# Patient Record
Sex: MALE | Race: ASIAN | NOT HISPANIC OR LATINO
[De-identification: names, ages, dates, MRNs, and addresses within clinical notes are randomized per-mention and may not be internally consistent; named-entity substitution may affect disease eponyms.]

---

## 2020-05-04 ENCOUNTER — APPOINTMENT (OUTPATIENT)
Dept: OTOLARYNGOLOGY | Facility: CLINIC | Age: 40
End: 2020-05-04

## 2020-07-13 ENCOUNTER — APPOINTMENT (OUTPATIENT)
Dept: OTOLARYNGOLOGY | Facility: CLINIC | Age: 40
End: 2020-07-13
Payer: COMMERCIAL

## 2020-07-13 VITALS
BODY MASS INDEX: 25.23 KG/M2 | HEIGHT: 66 IN | WEIGHT: 157 LBS | TEMPERATURE: 97.8 F | SYSTOLIC BLOOD PRESSURE: 128 MMHG | HEART RATE: 72 BPM | DIASTOLIC BLOOD PRESSURE: 91 MMHG

## 2020-07-13 DIAGNOSIS — Z78.9 OTHER SPECIFIED HEALTH STATUS: ICD-10-CM

## 2020-07-13 DIAGNOSIS — Z87.891 PERSONAL HISTORY OF NICOTINE DEPENDENCE: ICD-10-CM

## 2020-07-13 PROCEDURE — G0268 REMOVAL OF IMPACTED WAX MD: CPT

## 2020-07-13 PROCEDURE — 92567 TYMPANOMETRY: CPT

## 2020-07-13 PROCEDURE — 99204 OFFICE O/P NEW MOD 45 MIN: CPT | Mod: 25

## 2020-07-13 PROCEDURE — 92557 COMPREHENSIVE HEARING TEST: CPT

## 2020-07-13 RX ORDER — METHYLPREDNISOLONE 4 MG/1
4 TABLET ORAL
Qty: 21 | Refills: 0 | Status: ACTIVE | COMMUNITY
Start: 2020-07-13 | End: 1900-01-01

## 2020-07-13 RX ORDER — ICOSAPENT ETHYL 1000 MG/1
1 CAPSULE ORAL
Refills: 0 | Status: ACTIVE | COMMUNITY

## 2020-07-13 RX ORDER — MULTIVITAMIN
TABLET ORAL
Refills: 0 | Status: ACTIVE | COMMUNITY

## 2020-07-13 NOTE — DATA REVIEWED
[de-identified] : sl left Hearing loss\par Hearing Test performed to evaluate the extent of hearing loss and  to explain pt's symptoms\par

## 2020-07-13 NOTE — HISTORY OF PRESENT ILLNESS
[Vertigo] : vertigo [Dizziness] : dizziness [No] : patient does not have a  history of radiation therapy [de-identified] : 40 yo male\par Patient complains of vertigo x 3 weeks. States when he turns his head to the left the room starts spinning lasting several seconds. Gets left sided ear fullness with vertigo. Had an episode this morning. Has intermittent left ear tinnitus.  Now  he is feeling well. \par Also states back in September he had 3 ear infections left ear,  never had ear infections before. \par Pt has no ear pain, ear drainage, hearing loss, nasal congestion, nasal discharge, epistaxis, sinus infections, facial pain, facial pressure, throat pain, dysphagia or fevers\par \par  [Anxiety] : no anxiety [Headache] : no headache [Hearing Loss] : no hearing loss [Recurrent Otitis Media] : no recurrent otitis media [Otitis Media with Effusion] : no otitis media with effusion [Presbycusis] : no presbycusis [Congenital Ear Malformation] : no congenital ear malformation [Meniere Disease] : no Meniere disease [Otosclerosis] : no otosclerosis [Hypertension] : no hypertension [Perilymphatic Fistula] : no perilymphatic fistula [Loud Noise Exposure] : no history of loud noise exposure [Smoking] : no smoking [Early Onset Hearing Loss] : no early onset hearing loss [Stroke] : no stroke [Facial Pain] : no facial pain [Facial Pressure] : no facial pressure [Nasal Congestion] : no nasal congestion [Diplopia] : no diplopia [Ear Fullness] : no ear fullness [Allergic Rhinitis] : no allergic rhinitis [Maxillary Tooth Infection] : no maxillary tooth infection [Septal Deviation] : no septal deviation [Environmental Allergies] : no environmental allergies [Seasonal Allergies] : no seasonal allergies [Environmental Allergens] : no environmental allergens [Adenoidectomy] : no adenoidectomy [Nasal FB Removal] : no nasal foreign body removal [Allergies] : no allergies [Asthma] : no asthma [Neck Mass] : no neck mass [Neck Pain] : no neck pain [Chills] : no chills [Cold Intolerance] : no cold intolerance [Cough] : no cough [Heat Intolerance] : no heat intolerance [Fatigue] : no fatigue [Sialadenitis] : no sialadenitis [Hyperthyroidism] : no hyperthyroidism [Hodgkin Disease] : no hodgkin disease [Non-Hodgkin Lymphoma] : no non-hodgkin lymphoma [Tobacco Use] : no tobacco use [Alcohol Use] : no alcohol use [Graves Disease] : no graves disease [Thyroid Cancer] : no thyroid cancer

## 2020-07-13 NOTE — ASSESSMENT
[FreeTextEntry1] : Left Periph vestibulopathy 2/2 recent infection\par -Hearing Test performed to evaluate the extent of hearing loss and to explain pt's symptoms\par \par -Rx: medrol \par poss vestib testing and rehab\par \par

## 2020-07-13 NOTE — PHYSICAL EXAM
[Midline] : trachea located in midline position [Normal] : gait was normal [Lian-Ranjanake] : [place]-Hallpike: Positive [Hearing Loss Left Only] : normal [Hearing Loss Right Only] : normal [Nystagmus] : ~T no ~M nystagmus was seen [Fukuda Step Test] : Fukuda Step Test was Negative [FreeTextEntry8] : wax [FreeTextEntry9] : wax [de-identified] : Lian Hallpike + to the left-dizzy w/o nystagmus

## 2020-07-16 ENCOUNTER — OUTPATIENT (OUTPATIENT)
Dept: OUTPATIENT SERVICES | Facility: HOSPITAL | Age: 40
LOS: 1 days | Discharge: STILL ADMITTED OR ADMINISTRATIVE | End: 2020-07-16

## 2020-07-17 DIAGNOSIS — F41.0 PANIC DISORDER [EPISODIC PAROXYSMAL ANXIETY]: ICD-10-CM

## 2020-07-17 DIAGNOSIS — F41.1 GENERALIZED ANXIETY DISORDER: ICD-10-CM

## 2020-08-14 ENCOUNTER — APPOINTMENT (OUTPATIENT)
Dept: OTOLARYNGOLOGY | Facility: CLINIC | Age: 40
End: 2020-08-14
Payer: COMMERCIAL

## 2020-08-14 VITALS
BODY MASS INDEX: 25.23 KG/M2 | SYSTOLIC BLOOD PRESSURE: 125 MMHG | TEMPERATURE: 97.9 F | DIASTOLIC BLOOD PRESSURE: 89 MMHG | HEIGHT: 66 IN | WEIGHT: 157 LBS | HEART RATE: 66 BPM

## 2020-08-14 DIAGNOSIS — R42 DIZZINESS AND GIDDINESS: ICD-10-CM

## 2020-08-14 DIAGNOSIS — J31.0 CHRONIC RHINITIS: ICD-10-CM

## 2020-08-14 DIAGNOSIS — J34.2 DEVIATED NASAL SEPTUM: ICD-10-CM

## 2020-08-14 DIAGNOSIS — H81.92 UNSPECIFIED DISORDER OF VESTIBULAR FUNCTION, LEFT EAR: ICD-10-CM

## 2020-08-14 DIAGNOSIS — H61.23 IMPACTED CERUMEN, BILATERAL: ICD-10-CM

## 2020-08-14 DIAGNOSIS — R06.83 SNORING: ICD-10-CM

## 2020-08-14 DIAGNOSIS — H90.42 SENSORINEURAL HEARING LOSS, UNILATERAL, LEFT EAR, WITH UNRESTRICTED HEARING ON THE CONTRALATERAL SIDE: ICD-10-CM

## 2020-08-14 DIAGNOSIS — H93.12 TINNITUS, LEFT EAR: ICD-10-CM

## 2020-08-14 PROCEDURE — 92567 TYMPANOMETRY: CPT

## 2020-08-14 PROCEDURE — 92557 COMPREHENSIVE HEARING TEST: CPT

## 2020-08-14 PROCEDURE — 99214 OFFICE O/P EST MOD 30 MIN: CPT | Mod: 25

## 2020-08-14 PROCEDURE — 31231 NASAL ENDOSCOPY DX: CPT

## 2020-08-14 NOTE — ASSESSMENT
[FreeTextEntry1] : Left Periph vestibulopathy 2/2 recent infection\par -now resolved \par -Hearing Test performed to evaluate the extent of hearing loss and to explain pt's symptoms-wnl\par \par Snoringw/ assoc DNS\par -advised for mouth guard and breathe right nasal  srips\par \par f/u prn

## 2020-08-14 NOTE — PHYSICAL EXAM
[Midline] : trachea located in midline position [Nasal Endoscopy Performed] : nasal endoscopy was performed, see procedure section for findings [] : septum deviated to the right [Normal] : the left external canal was normal [Hearing Loss Left Only] : normal [Hearing Loss Right Only] : normal [Fukuda Step Test] : Fukuda Step Test was Negative [Nystagmus] : ~T no ~M nystagmus was seen

## 2020-08-14 NOTE — HISTORY OF PRESENT ILLNESS
[Vertigo] : vertigo [No] : patient does not have a  history of radiation therapy [Dizziness] : dizziness [de-identified] : 38 yo male\par Patient complains of vertigo x 3 weeks. States when he turns his head to the left the room starts spinning lasting several seconds. Gets left sided ear fullness with vertigo. Had an episode this morning. Has intermittent left ear tinnitus.  Now  he is feeling well. \par Also states back in September he had 3 ear infections left ear,  never had ear infections before. \par Pt has no ear pain, ear drainage, hearing loss, nasal congestion, nasal discharge, epistaxis, sinus infections, facial pain, facial pressure, throat pain, dysphagia or fevers\par \par  [FreeTextEntry1] : 8/13/2020-Patient presents for follow up. States vertigo has resolved, hearing has improved. Finished Medrol dose pack. \par Complains that he snores at night and was told by a different ENT that he has large adenoids.  [Anxiety] : no anxiety [Hearing Loss] : no hearing loss [Headache] : no headache [Presbycusis] : no presbycusis [Recurrent Otitis Media] : no recurrent otitis media [Otitis Media with Effusion] : no otitis media with effusion [Congenital Ear Malformation] : no congenital ear malformation [Meniere Disease] : no Meniere disease [Otosclerosis] : no otosclerosis [Loud Noise Exposure] : no history of loud noise exposure [Hypertension] : no hypertension [Perilymphatic Fistula] : no perilymphatic fistula [Early Onset Hearing Loss] : no early onset hearing loss [Smoking] : no smoking [Facial Pain] : no facial pain [Stroke] : no stroke [Facial Pressure] : no facial pressure [Nasal Congestion] : no nasal congestion [Diplopia] : no diplopia [Ear Fullness] : no ear fullness [Maxillary Tooth Infection] : no maxillary tooth infection [Allergic Rhinitis] : no allergic rhinitis [Septal Deviation] : no septal deviation [Environmental Allergies] : no environmental allergies [Adenoidectomy] : no adenoidectomy [Environmental Allergens] : no environmental allergens [Seasonal Allergies] : no seasonal allergies [Nasal FB Removal] : no nasal foreign body removal [Asthma] : no asthma [Allergies] : no allergies [Neck Mass] : no neck mass [Neck Pain] : no neck pain [Cold Intolerance] : no cold intolerance [Chills] : no chills [Cough] : no cough [Heat Intolerance] : no heat intolerance [Hyperthyroidism] : no hyperthyroidism [Fatigue] : no fatigue [Sialadenitis] : no sialadenitis [Hodgkin Disease] : no hodgkin disease [Non-Hodgkin Lymphoma] : no non-hodgkin lymphoma [Tobacco Use] : no tobacco use [Graves Disease] : no graves disease [Alcohol Use] : no alcohol use [Thyroid Cancer] : no thyroid cancer

## 2020-08-14 NOTE — DATA REVIEWED
[de-identified] : sl left Hearing loss\par Hearing Test performed to evaluate the extent of hearing loss and  to explain pt's symptoms\par

## 2020-08-14 NOTE — PROCEDURE
[FreeTextEntry6] : Anterior Rhinoscopy insufficient to account for symptoms.\par \par After informed verbal consent is obtained, the fiberoptic nasal endoscope #26 is passed via the right nasal cavity.\par The following anatomic sites were directly examined in a sequential fashion:\par The scope was introduced in both  nasal passages between the middle and inferior turbinates to exam the inferior portion of the middle meatus and the fontanelle, as well as the maxillary ostia.  Next, the scope was passed medially and posteriorly to the middle turbinates to examine the sphenoethmoid recess and the superior turbinate region.\par  \par \par   There is [ 0 ]% obstruction of the nasopharynx with adenoid tissue.\par \par A deviated nasal septum was noted causing obstruction.\par The turbinates were congested-bilateral.\par \par Right Side:\par ·               Mucosa: wnl\par ·               Mucous: none\par ·               Polyp: none\par ·               Inferior Turbinate: sl congested\par ·               Middle Turbinate:sl congested\par ·               Superior Turbinate: wnl\par ·               Inferior Meatus:clear\par ·               Middle Meatus: clear\par ·               Super Meatus: clear\par ·               Sphenoethmoidal Recess: wnl\par \par \par \par Left Side:\par ·               Mucosa: wnl\par ·               Mucous:none\par ·               Polyp: none\par ·               Inferior Turbinate: sl congested\par ·               Middle Turbinate: sl congested\par ·               Superior Turbinate:wnl\par ·               Inferior Meatus: clear\par ·               Middle Meatus: clear\par ·               Super Meatus:clear\par ·               Sphenoethmoidal Recess: wnl\par \par

## 2020-08-17 ENCOUNTER — OUTPATIENT (OUTPATIENT)
Dept: OUTPATIENT SERVICES | Facility: HOSPITAL | Age: 40
LOS: 1 days | Discharge: HOME | End: 2020-08-17

## 2020-10-08 DIAGNOSIS — F41.0 PANIC DISORDER [EPISODIC PAROXYSMAL ANXIETY]: ICD-10-CM

## 2020-10-16 DIAGNOSIS — F41.1 GENERALIZED ANXIETY DISORDER: ICD-10-CM

## 2022-12-30 ENCOUNTER — NON-APPOINTMENT (OUTPATIENT)
Age: 42
End: 2022-12-30

## 2023-02-26 ENCOUNTER — NON-APPOINTMENT (OUTPATIENT)
Age: 43
End: 2023-02-26

## 2023-09-24 ENCOUNTER — NON-APPOINTMENT (OUTPATIENT)
Age: 43
End: 2023-09-24

## 2024-02-21 ENCOUNTER — NON-APPOINTMENT (OUTPATIENT)
Age: 44
End: 2024-02-21

## 2024-03-28 ENCOUNTER — EMERGENCY (EMERGENCY)
Facility: HOSPITAL | Age: 44
LOS: 0 days | Discharge: HOME | End: 2024-03-28
Payer: COMMERCIAL

## 2024-03-28 VITALS
OXYGEN SATURATION: 96 % | SYSTOLIC BLOOD PRESSURE: 121 MMHG | RESPIRATION RATE: 17 BRPM | TEMPERATURE: 98 F | DIASTOLIC BLOOD PRESSURE: 85 MMHG | HEART RATE: 66 BPM

## 2024-03-28 VITALS
HEIGHT: 66 IN | DIASTOLIC BLOOD PRESSURE: 90 MMHG | RESPIRATION RATE: 18 BRPM | WEIGHT: 164.91 LBS | HEART RATE: 76 BPM | TEMPERATURE: 98 F | OXYGEN SATURATION: 99 % | SYSTOLIC BLOOD PRESSURE: 132 MMHG

## 2024-03-28 DIAGNOSIS — R42 DIZZINESS AND GIDDINESS: ICD-10-CM
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
## 2024-03-28 DIAGNOSIS — M54.2 CERVICALGIA: ICD-10-CM

## 2024-03-28 DIAGNOSIS — R51.9 HEADACHE, UNSPECIFIED: ICD-10-CM
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
## 2024-03-28 DIAGNOSIS — Z91.010 ALLERGY TO PEANUTS: ICD-10-CM

## 2024-03-28 PROCEDURE — 72125 CT NECK SPINE W/O DYE: CPT | Mod: 26,MC

## 2024-03-28 PROCEDURE — 99284 EMERGENCY DEPT VISIT MOD MDM: CPT

## 2024-03-28 RX ORDER — ACETAMINOPHEN 500 MG
650 TABLET ORAL ONCE
Refills: 0 | Status: COMPLETED | OUTPATIENT
Start: 2024-03-28 | End: 2024-03-28

## 2024-03-28 RX ORDER — IBUPROFEN 200 MG
600 TABLET ORAL ONCE
Refills: 0 | Status: COMPLETED | OUTPATIENT
Start: 2024-03-28 | End: 2024-03-28

## 2024-03-28 RX ORDER — LIDOCAINE 4 G/100G
1 CREAM TOPICAL ONCE
Refills: 0 | Status: COMPLETED | OUTPATIENT
Start: 2024-03-28 | End: 2024-03-28

## 2024-03-28 RX ORDER — DIAZEPAM 5 MG
5 TABLET ORAL ONCE
Refills: 0 | Status: DISCONTINUED | OUTPATIENT
Start: 2024-03-28 | End: 2024-03-28

## 2024-03-28 RX ORDER — IBUPROFEN 200 MG
1 TABLET ORAL
Qty: 15 | Refills: 0
Start: 2024-03-28 | End: 2024-04-01

## 2024-03-28 RX ORDER — LIDOCAINE 4 G/100G
1 CREAM TOPICAL
Qty: 1 | Refills: 0
Start: 2024-03-28 | End: 2024-04-01

## 2024-03-28 RX ORDER — METHOCARBAMOL 500 MG/1
2 TABLET, FILM COATED ORAL
Qty: 24 | Refills: 0
Start: 2024-03-28 | End: 2024-03-31

## 2024-03-28 RX ADMIN — Medication 650 MILLIGRAM(S): at 15:24

## 2024-03-28 RX ADMIN — LIDOCAINE 1 PATCH: 4 CREAM TOPICAL at 15:25

## 2024-03-28 RX ADMIN — Medication 5 MILLIGRAM(S): at 15:25

## 2024-03-28 RX ADMIN — Medication 600 MILLIGRAM(S): at 15:25
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
## 2024-03-28 NOTE — ED PROVIDER NOTE - PATIENT PORTAL LINK FT
You can access the FollowMyHealth Patient Portal offered by [facility] by registering at the following website: [email or web address]. By joining [employer]’s FollowMyHealth portal, you will also be able to view your health information using other applications (apps) compatible with our system.

## 2024-03-28 NOTE — ED PROVIDER NOTE - CLINICAL SUMMARY MEDICAL DECISION MAKING FREE TEXT BOX
Patient currently afebrile, hemodynamically stable, spO2 100%. Based on history and physical, differentials include but are not limited to muscle spasm vs contusion. Low suspicion for cord compression or acute fx. Plan to assess patient for acute pathology as listed above with CT. Will administer medications for symptomatic relief, follow-up on results, and reassess.
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
## 2024-03-28 NOTE — ED ADULT NURSE NOTE - NSFALLUNIVINTERV_ED_ALL_ED
Bed/Stretcher in lowest position, wheels locked, appropriate side rails in place/Call bell, personal items and telephone in reach/Instruct patient to call for assistance before getting out of bed/chair/stretcher/Non-slip footwear applied when patient is off stretcher/[place] to call system/Physically safe environment - no spills, clutter or unnecessary equipment/Purposeful proactive rounding/Room/bathroom lighting operational, light cord in reach

## 2025-06-22 NOTE — ED ADULT NURSE NOTE - NS ED NURSE DISCH DISPOSITION
Room air, poor PO intake, patient  refusing meals, patient refused transfer to chair, call light in place  Problem: Patient Centered Care  Goal: Patient preferences are identified and integrated in the patient's plan of care  Description: Interventions:  - What would you like us to know as we care for you? From home alone  - Provide timely, complete, and accurate information to patient/family  - Incorporate patient and family knowledge, values, beliefs, and cultural backgrounds into the planning and delivery of care  - Encourage patient/family to participate in care and decision-making at the level they choose  - Honor patient and family perspectives and choices  Outcome: Progressing       Problem: PAIN - ADULT  Goal: Verbalizes/displays adequate comfort level or patient's stated pain goal  Description: INTERVENTIONS:  - Encourage pt to monitor pain and request assistance  - Assess pain using appropriate pain scale  - Administer analgesics based on type and severity of pain and evaluate response  - Implement non-pharmacological measures as appropriate and evaluate response  - Consider cultural and social influences on pain and pain management  - Manage/alleviate anxiety  - Utilize distraction and/or relaxation techniques  - Monitor for opioid side effects  - Notify MD/LIP if interventions unsuccessful or patient reports new pain  - Anticipate increased pain with activity and pre-medicate as appropriate  Outcome: Progressing     Problem: SAFETY ADULT - FALL  Goal: Free from fall injury  Description: INTERVENTIONS:  - Assess pt frequently for physical needs  - Identify cognitive and physical deficits and behaviors that affect risk of falls.  - [place] fall precautions as indicated by assessment.  - Educate pt/family on patient safety including physical limitations  - Instruct pt to call for assistance with activity based on assessment  - Modify environment to reduce risk of injury  - Provide assistive devices as  appropriate  - Consider OT/PT consult to assist with strengthening/mobility  - Encourage toileting schedule  Outcome: Progressing     Problem: DISCHARGE PLANNING  Goal: Discharge to home or other facility with appropriate resources  Description: INTERVENTIONS:  - Identify barriers to discharge w/pt and caregiver  - Include patient/family/discharge partner in discharge planning  - Arrange for needed discharge resources and transportation as appropriate  - Identify discharge learning needs (meds, wound care, etc)  - Arrange for interpreters to assist at discharge as needed  - Consider post-discharge preferences of patient/family/discharge partner  - Complete POLST form as appropriate  - Assess patient's ability to be responsible for managing their own health  - Refer to Case Management Department for coordinating discharge planning if the patient needs post-hospital services based on physician/LIP order or complex needs related to functional status, cognitive ability or social support system  Outcome: Progressing     Problem: RISK FOR INFECTION - ADULT  Goal: Absence of fever/infection during anticipated neutropenic period  Description: INTERVENTIONS  - Monitor WBC  - Administer growth factors as ordered  - Implement neutropenic guidelines  Outcome: Progressing     Problem: RESPIRATORY - ADULT  Goal: Achieves optimal ventilation and oxygenation  Description: INTERVENTIONS:  - Assess for changes in respiratory status  - Assess for changes in mentation and behavior  - Position to facilitate oxygenation and minimize respiratory effort  - Oxygen supplementation based on oxygen saturation or ABGs  - Provide Smoking Cessation handout, if applicable  - Encourage broncho-pulmonary hygiene including cough, deep breathe, Incentive Spirometry  - Assess the need for suctioning and perform as needed  - Assess and instruct to report SOB or any respiratory difficulty  - Respiratory Therapy support as indicated  - Manage/alleviate  anxiety  - Monitor for signs/symptoms of CO2 retention  Outcome: Progressing     Problem: Impaired Functional Mobility  Goal: Achieve highest/safest level of mobility/gait  Description: Interventions:  - Assess patient's functional ability and stability  - Promote increasing activity/tolerance for mobility and gait  - Educate and engage patient/family in tolerated activity level and precautions  - Recommend use of sit-stand lift for transfers  Outcome: Progressing     Problem: METABOLIC/FLUID AND ELECTROLYTES - ADULT  Goal: Electrolytes maintained within normal limits  Description: INTERVENTIONS:  - Monitor labs and rhythm and assess patient for signs and symptoms of electrolyte imbalances  - Administer electrolyte replacement as ordered  - Monitor response to electrolyte replacements, including rhythm and repeat lab results as appropriate  - Fluid restriction as ordered  - Instruct patient on fluid and nutrition restrictions as appropriate  Outcome: Progressing  Goal: Hemodynamic stability and optimal renal function maintained  Description: INTERVENTIONS:  - Monitor labs and assess for signs and symptoms of volume excess or deficit  - Monitor intake, output and patient weight  - Monitor urine specific gravity, serum osmolarity and serum sodium as indicated or ordered  - Monitor response to interventions for patient's volume status, including labs, urine output, blood pressure (other measures as available)  - Encourage oral intake as appropriate  - Instruct patient on fluid and nutrition restrictions as appropriate  Outcome: Progressing     Problem: CARDIOVASCULAR - ADULT  Goal: Maintains optimal cardiac output and hemodynamic stability  Description: INTERVENTIONS:  - Monitor vital signs, rhythm, and trends  - Monitor for bleeding, hypotension and signs of decreased cardiac output  - Evaluate effectiveness of vasoactive medications to optimize hemodynamic stability  - Monitor arterial and/or venous puncture sites  for bleeding and/or hematoma  - Assess quality of pulses, skin color and temperature  - Assess for signs of decreased coronary artery perfusion - ex. Angina  - Evaluate fluid balance, assess for edema, trend weights  Outcome: Progressing  Goal: Absence of cardiac arrhythmias or at baseline  Description: INTERVENTIONS:  - Continuous cardiac monitoring, monitor vital signs, obtain 12 lead EKG if indicated  - Evaluate effectiveness of antiarrhythmic and heart rate control medications as ordered  - Initiate emergency measures for life threatening arrhythmias  - Monitor electrolytes and administer replacement therapy as ordered  Outcome: Progressing     Problem: NEUROLOGICAL - ADULT  Goal: Achieves stable or improved neurological status  Description: INTERVENTIONS  - Assess for and report changes in neurological status  - Initiate measures to prevent increased intracranial pressure  - Maintain blood pressure and fluid volume within ordered parameters to optimize cerebral perfusion and minimize risk of hemorrhage  - Monitor temperature, glucose, and sodium. Initiate appropriate interventions as ordered  Outcome: Progressing  Goal: Absence of seizures  Description: INTERVENTIONS  - Monitor for seizure activity  - Administer anti-seizure medications as ordered  - Monitor neurological status  Outcome: Progressing  Goal: Remains free of injury related to seizure activity  Description: INTERVENTIONS:  - Maintain airway, patient safety  and administer oxygen as ordered  - Monitor patient for seizure activity, document and report duration and description of seizure to MD/LIP  - If seizure occurs, turn patient to side and suction secretions as needed  - Reorient patient post seizure  - Seizure pads on all 4 side rails  - Instruct patient/family to notify RN of any seizure activity  - Instruct patient/family to call for assistance with activity based on assessment  Outcome: Progressing  Goal: Achieves maximal functionality and  self care  Description: INTERVENTIONS  - Monitor swallowing and airway patency with patient fatigue and changes in neurological status  - Encourage and assist patient to increase activity and self care with guidance from PT/OT  - Encourage visually impaired, hearing impaired and aphasic patients to use assistive/communication devices  Outcome: Progressing         Discharged